# Patient Record
(demographics unavailable — no encounter records)

---

## 2025-07-08 NOTE — PLAN
[FreeTextEntry1] : Patient to follow up in 1 year for annual GYN exam considering HRT  Mammogram and bilateral breast US due: 01/26 Colonoscopy due: 27 with dr. matthew.  Bone density due: 6/29 (5 yr f/u)  Pap ordered Hemoccult ordered All questions answered, patient is agreeable with plan  I Patricia [facility] am scribing for the presence of Dr. Llamas the following sections HISTORY OF PRESENT ILLNESS, PAST MEDICAL/FAMILY/SOCIAL HISTORY; REVIEW OF SYSTEMS; VITAL SIGNS; PHYSICAL EXAM; DISPOSITION. I personally performed the services described in the documentation, reviewed the documentation recorded by the scribe in my presence and it accurately and completely records my words and actions.

## 2025-07-08 NOTE — HISTORY OF PRESENT ILLNESS
[FreeTextEntry1] : Patient is a 57 yo female here today for annual visit. She denies any GYN complaints at this time.  hx of C/Sx1

## 2025-07-08 NOTE — PHYSICAL EXAM
[Appropriately responsive] : appropriately responsive [Alert] : alert [No Acute Distress] : no acute distress [No Lymphadenopathy] : no lymphadenopathy [Soft] : soft [Non-tender] : non-tender [Non-distended] : non-distended [No HSM] : No HSM [No Lesions] : no lesions [No Mass] : no mass [Oriented x3] : oriented x3 [Examination Of The Breasts] : a normal appearance [No Masses] : no breast masses were palpable [Labia Majora] : normal [Labia Minora] : normal [Normal] : normal [Uterine Adnexae] : normal [FreeTextEntry2] : Suad FNP-BC  [FreeTextEntry5] : difficult to reach cervix, long speculum needed & pt propped up.

## 2025-07-08 NOTE — HISTORY OF PRESENT ILLNESS
[FreeTextEntry1] : Patient is a 59 yo female here today for annual visit. She denies any GYN complaints at this time.  hx of C/Sx1

## 2025-07-08 NOTE — PLAN
[FreeTextEntry1] : Patient to follow up in 1 year for annual GYN exam considering HRT  Mammogram and bilateral breast US due: 01/26 Colonoscopy due: 27 with dr. matthew.  Bone density due: 6/29 (5 yr f/u)  Pap ordered Hemoccult ordered All questions answered, patient is agreeable with plan  I Patricia Fox [facility] am scribing for the presence of Dr. Llamas the following sections HISTORY OF PRESENT ILLNESS, PAST MEDICAL/FAMILY/SOCIAL HISTORY; REVIEW OF SYSTEMS; VITAL SIGNS; PHYSICAL EXAM; DISPOSITION. I personally performed the services described in the documentation, reviewed the documentation recorded by the scribe in my presence and it accurately and completely records my words and actions.